# Patient Record
Sex: MALE | Race: WHITE | URBAN - METROPOLITAN AREA
[De-identification: names, ages, dates, MRNs, and addresses within clinical notes are randomized per-mention and may not be internally consistent; named-entity substitution may affect disease eponyms.]

---

## 2017-10-10 ENCOUNTER — OFFICE VISIT (OUTPATIENT)
Dept: DERMATOLOGY | Age: 62
End: 2017-10-10

## 2017-10-10 DIAGNOSIS — L57.0 AK (ACTINIC KERATOSIS): ICD-10-CM

## 2017-10-10 DIAGNOSIS — L82.1 SK (SEBORRHEIC KERATOSIS): Primary | ICD-10-CM

## 2017-10-10 DIAGNOSIS — Z85.828 HISTORY OF BASAL CELL CARCINOMA: ICD-10-CM

## 2017-10-10 DIAGNOSIS — D48.5 NEOPLASM OF UNCERTAIN BEHAVIOR OF SKIN: ICD-10-CM

## 2017-10-10 PROCEDURE — 17000 DESTRUCT PREMALG LESION: CPT | Performed by: DERMATOLOGY

## 2017-10-10 PROCEDURE — 99213 OFFICE O/P EST LOW 20 MIN: CPT | Performed by: DERMATOLOGY

## 2017-10-10 PROCEDURE — 11100 PR BIOPSY OF SKIN LESION: CPT | Performed by: DERMATOLOGY

## 2017-10-10 NOTE — PATIENT INSTRUCTIONS

## 2017-10-10 NOTE — PROGRESS NOTES
macule. 4.  Clear. Assessment and Plan     1. SK (seborrheic keratosis)     Reassurance. 2. Neoplasm of uncertain behavior of skin, right proximal posterior thigh - r/o BCC    Discussed possible diagnosis; patient agreeable to biopsy (verbal consent obtained). The area(s) to be biopsied were marked with a surgical pen. Alcohol was used to cleanse the site. Local anesthesia was acheived with 1% lidocaine with epinephrine. Shave biopsy was performed using a razor blade. Hemostasis was achieved with aluminum chloride. The wound(s) were dressed with petrolatum and covered with a bandage. Wound care instructions were reviewed. 1 Specimen (s) sent to pathology. The specimen bottles were appropriately labeled. We also reviewed the risks of bleeding, scar, and infection. 3. AK (actinic keratosis) - 1    2 cycles of liquid nitrogen applied to 1 AK on the left forearm. Patient was educated regarding the potential risks of blister formation, discomfort, hypopigmentation, and scar. Wound care was discussed. 4. History of basal cell carcinoma - clear    Encouraged sun protective behaviors. Return in about 6 months (around 4/10/2018).

## 2017-10-18 ENCOUNTER — TELEPHONE (OUTPATIENT)
Dept: DERMATOLOGY | Age: 62
End: 2017-10-18

## 2017-11-10 ENCOUNTER — OFFICE VISIT (OUTPATIENT)
Dept: DERMATOLOGY | Age: 62
End: 2017-11-10

## 2017-11-10 DIAGNOSIS — C44.712 BCC (BASAL CELL CARCINOMA), LEG, RIGHT: Primary | ICD-10-CM

## 2017-11-10 PROCEDURE — 17261 DSTRJ MAL LES T/A/L .6-1.0CM: CPT | Performed by: DERMATOLOGY

## 2017-11-10 NOTE — PROGRESS NOTES
[facility]  Ailin Aguiar MD  [address]  1955    57 y.o. male     Date of Visit: 11/10/2017    Chief Complaint: [address]    History of Present Illness:    He returns today for curettage of a superficial BCC on the right proximal posterior thigh. Review of Systems:  Gen: Feels well, good sense of health. Past Medical History, Family History, Surgical History, Medications and Allergies reviewed. Past Medical History:   Diagnosis Date    Bronchitis      No past surgical history on file. No Known Allergies  Outpatient Prescriptions Marked as Taking for the 11/10/17 encounter (Office Visit) with Keren Wong MD   Medication Sig Dispense Refill    aspirin 81 MG tablet Take 81 mg by mouth daily      Omega-3 Fatty Acids (OMEGA 3 PO) Take  by mouth.  Vitamin D (CHOLECALCIFEROL) 1000 UNITS CAPS capsule Take 1,000 Units by mouth daily.  Multiple Vitamin (MULTIVITAMINS PO) Take  by mouth. Social History:  Occupation:  [employer]     Daughter in internal medicine residency. Physical Examination       Well appearing. 1.  Right proximal posterior thigh - 9 mm round pink macule. Assessment and Plan     1. BCC (basal cell carcinoma), right posterior thigh - 9 mm    The area to be treated with cleansed with alcohol and marked with surgical marking pen. Local anesthesia was acheived with 1% lidocaine with epinephrine. Sharp curettage was performed in 3 different directions. Hemostasis was obtained with aluminum chloride. The wound was dressed with petrolatum and a bandage. Patient was educated regarding risks including bleeding, discomfort, and risk of recurrence.

## 2018-03-22 ENCOUNTER — OFFICE VISIT (OUTPATIENT)
Dept: DERMATOLOGY | Age: 63
End: 2018-03-22

## 2018-03-22 DIAGNOSIS — L82.1 SK (SEBORRHEIC KERATOSIS): Primary | ICD-10-CM

## 2018-03-22 DIAGNOSIS — L11.1 GROVER'S DISEASE: ICD-10-CM

## 2018-03-22 DIAGNOSIS — Z85.828 HISTORY OF BASAL CELL CARCINOMA: ICD-10-CM

## 2018-03-22 PROCEDURE — 99213 OFFICE O/P EST LOW 20 MIN: CPT | Performed by: DERMATOLOGY

## 2018-03-22 NOTE — PROGRESS NOTES
Anderson's disease - very mild    Observe. 3. History of basal cell carcinomas -     Sun protective behaviors and self skin examinations were encouraged. Call for any new or concerning lesions. Return in about 1 year (around 3/22/2019).

## 2019-03-26 ENCOUNTER — OFFICE VISIT (OUTPATIENT)
Dept: DERMATOLOGY | Age: 64
End: 2019-03-26
Payer: COMMERCIAL

## 2019-03-26 DIAGNOSIS — L57.0 AK (ACTINIC KERATOSIS): Primary | ICD-10-CM

## 2019-03-26 DIAGNOSIS — L24.9 IRRITANT HAND DERMATITIS: ICD-10-CM

## 2019-03-26 DIAGNOSIS — L82.1 SK (SEBORRHEIC KERATOSIS): ICD-10-CM

## 2019-03-26 DIAGNOSIS — Z85.828 HISTORY OF BASAL CELL CARCINOMA (BCC): ICD-10-CM

## 2019-03-26 PROCEDURE — 99213 OFFICE O/P EST LOW 20 MIN: CPT | Performed by: DERMATOLOGY

## 2019-03-26 RX ORDER — ATORVASTATIN CALCIUM 10 MG/1
TABLET, FILM COATED ORAL
COMMUNITY
Start: 2019-02-22

## 2019-03-26 RX ORDER — TRIAMCINOLONE ACETONIDE 1 MG/G
CREAM TOPICAL
Qty: 45 G | Refills: 1 | Status: SHIPPED | OUTPATIENT
Start: 2019-03-26 | End: 2020-10-06 | Stop reason: SDUPTHER

## 2020-10-06 ENCOUNTER — OFFICE VISIT (OUTPATIENT)
Dept: DERMATOLOGY | Age: 65
End: 2020-10-06
Payer: MEDICARE

## 2020-10-06 VITALS — TEMPERATURE: 98.1 F

## 2020-10-06 PROCEDURE — 1123F ACP DISCUSS/DSCN MKR DOCD: CPT | Performed by: DERMATOLOGY

## 2020-10-06 PROCEDURE — 99213 OFFICE O/P EST LOW 20 MIN: CPT | Performed by: DERMATOLOGY

## 2020-10-06 PROCEDURE — G8484 FLU IMMUNIZE NO ADMIN: HCPCS | Performed by: DERMATOLOGY

## 2020-10-06 PROCEDURE — G8427 DOCREV CUR MEDS BY ELIG CLIN: HCPCS | Performed by: DERMATOLOGY

## 2020-10-06 PROCEDURE — 1036F TOBACCO NON-USER: CPT | Performed by: DERMATOLOGY

## 2020-10-06 PROCEDURE — G8421 BMI NOT CALCULATED: HCPCS | Performed by: DERMATOLOGY

## 2020-10-06 PROCEDURE — 3017F COLORECTAL CA SCREEN DOC REV: CPT | Performed by: DERMATOLOGY

## 2020-10-06 PROCEDURE — 11102 TANGNTL BX SKIN SINGLE LES: CPT | Performed by: DERMATOLOGY

## 2020-10-06 PROCEDURE — 4040F PNEUMOC VAC/ADMIN/RCVD: CPT | Performed by: DERMATOLOGY

## 2020-10-06 RX ORDER — TRIAMCINOLONE ACETONIDE 1 MG/G
CREAM TOPICAL
Qty: 45 G | Refills: 1 | Status: SHIPPED | OUTPATIENT
Start: 2020-10-06

## 2020-10-06 NOTE — PATIENT INSTRUCTIONS
DRY SKIN CARE    1. Do not take more than 1 shower or bath each day. Try to spend 10 minutes or less in the shower/bath. Avoid hot showers as this can damage the skin and make the dryness worse. 2. Use a moisturizing or mild soap such Dove (for sensitive skin), Cetaphil (Cleansing Bar,Gentle Body Wash, Restoraderm Soothing Wash), CeraVe Hydrating Body Wash, Eucerin Skin Calming Body Wash, or Aveeno Daily Moisturizing Body Wash. Antibacterial soaps can be too drying. 3. Use soap only on limited areas (face, underarms, groin and buttocks). Try to avoid using soap on the arms, legs, trunk and back. 4. After showering, pat dry with a towel and generously apply a thick moisturizer all over. Use a moisturizer every day even if you are not showering that particular day. 5. If you are able, apply the moisturizer a second time during the day as well. 6. If a prescription cream or ointment has been ordered for you, apply the prescription medication to affected areas after your bath/shower while the skin is still damp, then apply the moisturizer to the remainder of the skin. 7. When it comes to moisturizers, the thicker the better. Ointments (such as vaseline) are thicker than creams, and creams are thicker than lotions. Suggested over-the-counter moisturizers include:    · CeraVe Lotion or Cream  · Cetaphil Lotion or Cream  · Eucerin Advanced Repair Cream, Advanced Repair Lotion, Eczema Relief Cream or Intensive Repair Lotion.    · Lubriderm Lotion  · Vaseline (petroleum jelly)  · Aquaphor  · Kassandra moisturizing lotion or cream  · Neutrogena Hand Cream  · Aveeno Moisturizing Cream or Lotion  · Curel Ultra Healing   · Vanicream Moisturizing Skin Cream  ·

## 2020-10-06 NOTE — PROGRESS NOTES
[facility]  Kavita Bender MD  [address]  1955    77 y.o. male     Date of Visit: 10/6/2020    Chief Complaint: skin lesions    History of Present Illness:    1. He reports a stable asymptomatic lesion on the back. 2.  Unknown duration of a persistent pink lesion on the right side of the back. 3.  He also has a history of irritant hand dermatitis-worse during the colder weather months. Improves with triamcinolone 0.1% cream.    Derm Hx:   Superficial BCC on the right posterior thigh - treated with curettage on 11/10/17. Superficial BCC of the left mid radial forearm-treated with curettage on 1/23/2015. 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 Review of Systems:  Skin: No new or changing moles. Past Medical History, Family History, Surgical History, Medications and Allergies reviewed. Past Medical History:   Diagnosis Date    Bronchitis      History reviewed. No pertinent surgical history. No Known Allergies  Outpatient Medications Marked as Taking for the 10/6/20 encounter (Office Visit) with Rip Goodrich MD   Medication Sig Dispense Refill    triamcinolone (KENALOG) 0.1 % cream Apply to affected areas on the hands twice daily for up to 2 weeks or until improved. 45 g 1    atorvastatin (LIPITOR) 10 MG tablet       aspirin 81 MG tablet Take 81 mg by mouth daily      Omega-3 Fatty Acids (OMEGA 3 PO) Take  by mouth.  Multiple Vitamin (MULTIVITAMINS PO) Take  by mouth. Social History:  Occupation:  Retired.      Daughter is an internist at [employer], had first baby in June 2020      Physical Examination       The following were examined and determined to be normal: Psych/Neuro, Scalp/hair, Head/face, Conjunctivae/eyelids, Gums/teeth/lips, Breast/axilla/chest, Abdomen and Nails/digits.     The following were examined and determined to be abnormal: Neck, Back, RUE, LUE, RLE and LLE.     Well-appearing. 1.  Right mid upper back with a stuck on appearing verrucous tan-brown plaque. 2.  Right anterior lateral neck with an oval-shaped pearly pink macule. 3.  Has unremarkable today. 4.  Back and extremities with diffuse flaky scaling. Assessment and Plan     1. SK (seborrheic keratosis)     Reassurance. 2. Neoplasm of uncertain behavior of skin, right anterior lateral neck-question BCC    Discussed possible diagnosis; patient agreeable to biopsy (verbal consent obtained). The area(s) to be biopsied were marked with a surgical pen. Alcohol was used to cleanse the site. Local anesthesia was acheived with 1% lidocaine with epinephrine. Shave biopsy was performed using a razor blade. Hemostasis was achieved with aluminum chloride. The wound(s) were dressed with petrolatum and covered with a bandage. Wound care instructions were reviewed. 1 Specimen (s) sent to pathology. The specimen bottles were appropriately labeled. We also reviewed the risks of bleeding, scar, and infection. 3. Irritant hand dermatitis - clear today    Triamcinolone 0.1% cream twice daily as needed for recurrences. 4. Xerosis cutis     We discussed dry skin care measures including: use of a mild soap (like Dove, Olay or Cetaphil) and limiting use to intertriginous regions; addition of a moisturizer (preferrably cream based) to the trunk and extremities immediately after showering. Return in about 1 year (around 10/6/2021).

## 2020-10-08 ENCOUNTER — TELEPHONE (OUTPATIENT)
Dept: DERMATOLOGY | Age: 65
End: 2020-10-08

## 2020-10-08 LAB — DERMATOLOGY PATHOLOGY REPORT: ABNORMAL

## 2020-10-08 NOTE — TELEPHONE ENCOUNTER
Patient would like to reschedule his procedure with Dr. Jonah Barragan for 10/16 and possibly come in the next week    Please call patient [phone]     Thank you

## 2020-10-23 ENCOUNTER — PROCEDURE VISIT (OUTPATIENT)
Dept: DERMATOLOGY | Age: 65
End: 2020-10-23
Payer: MEDICARE

## 2020-10-23 VITALS — TEMPERATURE: 97.8 F

## 2020-10-23 PROCEDURE — 17270 DSTR MAL LES S/N/H/F/G .5 /<: CPT | Performed by: DERMATOLOGY

## 2020-10-23 NOTE — PROGRESS NOTES
[facility]  Johanna Mccullough MD  [address]  1955    77 y.o. male     Date of Visit: 10/23/2020    Chief Complaint: [facility]    History of Present Illness:    Here today for treatment of a BCC on the neck. DIAGNOSIS:   Right anterolateral neck-   Basal Cell Carcinoma, Superficial Type   Multiple, apparently discrete nests of basal cell carcinoma   arise from the basal layer of the epidermis and are   spreading laterally and slightly downward into the   superficial dermis. RESULTS SIGNATURE   Chrissy Haji MD   Electronic Signature: 39 OCT 2020 09:31 AM       Review of Systems:  Gen: Feels well, good sense of health. Past Medical History, Family History, Surgical History, Medications and Allergies reviewed. Past Medical History:   Diagnosis Date    Bronchitis      No past surgical history on file. No Known Allergies  Outpatient Medications Marked as Taking for the 10/23/20 encounter (Procedure visit) with Arslan Hernandez MD   Medication Sig Dispense Refill    triamcinolone (KENALOG) 0.1 % cream Apply to affected areas on the hands twice daily for up to 2 weeks or until improved. 45 g 1    atorvastatin (LIPITOR) 10 MG tablet       aspirin 81 MG tablet Take 81 mg by mouth daily      Omega-3 Fatty Acids (OMEGA 3 PO) Take  by mouth.  Multiple Vitamin (MULTIVITAMINS PO) Take  by mouth. Physical Examination     Well appearing. 1.  Right anterolateral neck - 5 mm round pink macule. Assessment and Plan     1. Small superficial basal cell carcinoma (BCC) of right side of neck - 5 mm    The area to be treated with cleansed with alcohol and marked with surgical marking pen. Local anesthesia was acheived with 1% lidocaine with epinephrine. Sharp curettage was performed in 3 different directions. Hemostasis was obtained with aluminum chloride. The wound was dressed with petrolatum and a bandage.     Patient was educated regarding risks

## 2021-10-05 ENCOUNTER — OFFICE VISIT (OUTPATIENT)
Dept: DERMATOLOGY | Age: 66
End: 2021-10-05
Payer: MEDICARE

## 2021-10-05 VITALS — TEMPERATURE: 97.7 F

## 2021-10-05 DIAGNOSIS — L82.1 SK (SEBORRHEIC KERATOSIS): Primary | ICD-10-CM

## 2021-10-05 DIAGNOSIS — L57.0 ACTINIC KERATOSIS: ICD-10-CM

## 2021-10-05 DIAGNOSIS — L81.4 SOLAR LENTIGO: ICD-10-CM

## 2021-10-05 DIAGNOSIS — L85.3 XEROSIS CUTIS: ICD-10-CM

## 2021-10-05 PROCEDURE — 99213 OFFICE O/P EST LOW 20 MIN: CPT | Performed by: DERMATOLOGY

## 2021-10-05 NOTE — PROGRESS NOTES
Breast/axilla/chest, Abdomen, Back, RUE, LUE and Nails/digits. The following were examined and determined to be abnormal: Head/face, RLE and LLE. Well appearing. 1.  Right mid upper back - stuck on appearing verrucous grey brown plaque. 2.  Left nasal tip and upper forehead with few scaly pink macules. 3.  Right anterior shoulder - well defined round smooth light brown patch. 4.  Lower legs with diffuse scaling. Assessment and Plan     1. SK (seborrheic keratosis)     Reassurance. 2. Actinic keratosis - few    We discussed the relation to chronic cumulative sun exposure and the low premalignant potential.     Counseling regarding sun protective behaviors was performed including sun avoidance, hats and use of at least SPF 30 sunscreen. 3. Solar lentigo     Monitor for change. 4. Xerosis cutis     Encouraged daily use of emollients. Return in about 1 year (around 10/5/2022).     --Elvia Encarnacion MD

## 2022-10-11 ENCOUNTER — OFFICE VISIT (OUTPATIENT)
Dept: DERMATOLOGY | Age: 67
End: 2022-10-11
Payer: MEDICARE

## 2022-10-11 DIAGNOSIS — L82.1 SK (SEBORRHEIC KERATOSIS): Primary | ICD-10-CM

## 2022-10-11 DIAGNOSIS — L24.9 IRRITANT DERMATITIS: ICD-10-CM

## 2022-10-11 DIAGNOSIS — L57.0 ACTINIC KERATOSIS: ICD-10-CM

## 2022-10-11 PROCEDURE — 17000 DESTRUCT PREMALG LESION: CPT | Performed by: DERMATOLOGY

## 2022-10-11 PROCEDURE — 1123F ACP DISCUSS/DSCN MKR DOCD: CPT | Performed by: DERMATOLOGY

## 2022-10-11 PROCEDURE — 99213 OFFICE O/P EST LOW 20 MIN: CPT | Performed by: DERMATOLOGY

## 2022-10-11 NOTE — PROGRESS NOTES
[facility]  Danny Hoyt MD  [address]  1955    69 y.o. male     Date of Visit: 10/11/2022    Chief Complaint: skin lesions    History of Present Illness:    1. He reports a persistent asymptomatic lesion on the right shoulder. 2.  Unknown duration of a persistent scaly lesion on the left hand. 3.  He complains of an intermittent pruritic and tender rash in the perianal region. Improves with Aquaphor. Derm Hx:     Superficial BCC of the right anterior lateral neck-treated with curettage on 10/23/2020. Superficial BCC on the right posterior thigh - treated with curettage on 11/10/17. Superficial BCC of the left mid radial forearm-treated with curettage on 1/23/2015. 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 Review of Systems:  Gen: Feels well, good sense of health. Past Medical History, Family History, Surgical History, Medications and Allergies reviewed. Past Medical History:   Diagnosis Date    Bronchitis      History reviewed. No pertinent surgical history. No Known Allergies  Outpatient Medications Marked as Taking for the 10/11/22 encounter (Office Visit) with Memory Crigler, MD   Medication Sig Dispense Refill    hydrocortisone 2.5 % ointment Apply to affected area twice daily until improved. 20 g 1    atorvastatin (LIPITOR) 10 MG tablet       Omega-3 Fatty Acids (OMEGA 3 PO) Take  by mouth. Multiple Vitamin (MULTIVITAMINS PO) Take  by mouth. Social History:  Occupation:  Retired. Daughter was an internist at [employer], has 2 boys (in [place] [employer] now). Physical Examination       The following were examined and determined to be normal: Psych/Neuro, Scalp/hair, Head/face, Conjunctivae/eyelids, Gums/teeth/lips, Neck, Breast/axilla/chest, Abdomen, Back, RUE, RLE, and LLE.     The following were examined and determined to be abnormal: LUE and Genitalia/groin/buttocks. Well appearing. 1.  Right anterior shoulder - stuck on appearing thin tan papule. 2.  Left hand - 1 keratotic pink macule. 3.  Perianal region with mild erythema. Assessment and Plan     1. SK (seborrheic keratosis)     Reassurance. 2. Actinic keratosis - 1    Cryotherapy was discussed and patient agreed to proceed. Consent was obtained. 1 lesions were treated cryotherapy: left hand. 2 cycles of liquid nitrogen applied to each lesion for 5 seconds using a [employer] cryo spray gun. Patient was educated regarding the potential risks of blister formation and discomfort. Wound care was discussed. The patient tolerated the procedure well and there were no immediate complications. 3. Irritant dermatitis of the perianal region    Hydrocortisone 2.5% ointment twice daily as for flares. Return in about 1 year (around 10/11/2023).     --Thien Villasenor MD